# Patient Record
Sex: MALE | Race: WHITE
[De-identification: names, ages, dates, MRNs, and addresses within clinical notes are randomized per-mention and may not be internally consistent; named-entity substitution may affect disease eponyms.]

---

## 2018-12-05 ENCOUNTER — HOSPITAL ENCOUNTER (OUTPATIENT)
Dept: HOSPITAL 74 - FASU-ENDO | Age: 23
Discharge: HOME | End: 2018-12-05
Attending: INTERNAL MEDICINE
Payer: COMMERCIAL

## 2018-12-05 VITALS — DIASTOLIC BLOOD PRESSURE: 76 MMHG | HEART RATE: 72 BPM | SYSTOLIC BLOOD PRESSURE: 120 MMHG

## 2018-12-05 VITALS — BODY MASS INDEX: 26.6 KG/M2

## 2018-12-05 VITALS — TEMPERATURE: 98 F

## 2018-12-05 DIAGNOSIS — K63.5: ICD-10-CM

## 2018-12-05 DIAGNOSIS — K64.8: Primary | ICD-10-CM

## 2018-12-05 PROCEDURE — 0DBK8ZX EXCISION OF ASCENDING COLON, VIA NATURAL OR ARTIFICIAL OPENING ENDOSCOPIC, DIAGNOSTIC: ICD-10-PCS | Performed by: INTERNAL MEDICINE

## 2018-12-05 PROCEDURE — 0DBN8ZX EXCISION OF SIGMOID COLON, VIA NATURAL OR ARTIFICIAL OPENING ENDOSCOPIC, DIAGNOSTIC: ICD-10-PCS | Performed by: INTERNAL MEDICINE

## 2018-12-05 PROCEDURE — 0DBP8ZX EXCISION OF RECTUM, VIA NATURAL OR ARTIFICIAL OPENING ENDOSCOPIC, DIAGNOSTIC: ICD-10-PCS | Performed by: INTERNAL MEDICINE

## 2018-12-05 PROCEDURE — 0DBL8ZX EXCISION OF TRANSVERSE COLON, VIA NATURAL OR ARTIFICIAL OPENING ENDOSCOPIC, DIAGNOSTIC: ICD-10-PCS | Performed by: INTERNAL MEDICINE

## 2018-12-05 PROCEDURE — 0DBM8ZX EXCISION OF DESCENDING COLON, VIA NATURAL OR ARTIFICIAL OPENING ENDOSCOPIC, DIAGNOSTIC: ICD-10-PCS | Performed by: INTERNAL MEDICINE

## 2018-12-10 NOTE — PATH
Surgical Pathology Report



Patient Name:  MARY DONATO

Accession #:  [id number]

[facility]. Rec. #:  [id number]                                                        

   /Age/Gender:  1995 (Age: 23) / M

Account:  [id number]                                                          

             Location: [facility]

Taken:  2018

Received:  2018

Reported:  12/10/2018

Physicians:  Marilee Glover M.D.

  



Specimen(s) Received

A: BX RIGHT COLON 

B: TRANSVERSE COLON 

C: DESCENDING COLON 

D: BX SIGMOID 

E: RECTUM 





Clinical History

Rectal bleeding

Postoperative diagnosis: Small hemorrhoids







Final Diagnosis

A. RIGHT COLON, BIOPSY:

COLONIC MUCOSA WITH NO PATHOLOGIC FINDINGS.



B. TRANSVERSE COLON, BIOPSY:

COLONIC MUCOSA SHOWING SMALL BENIGN/REACTIVE LYMPHOID AGGREGATE.



C. DESCENDING COLON, BIOPSY:

COLONIC MUCOSA SHOWING SMALL BENIGN/REACTIVE LYMPHOID AGGREGATE.



D. SIGMOID, BIOPSY:

COLONIC MUCOSA WITH NO PATHOLOGIC FINDINGS.



E. RECTUM, BIOPSY:

COLONIC MUCOSA SHOWING MILD SURFACE HYPERPLASTIC CHANGE AND SMALL

BENIGN/REACTIVE LYMPHOID AGGREGATE.







***Electronically Signed***

Thu Talavera M.D.





Gross Description

A.  Received in formalin, labeled "biopsy right colon" is a tan, irregular

portion of soft tissue measuring 0.4 cm. in greatest dimension. The specimen is

submitted in toto in one cassette.



B.  Received in formalin, labeled "biopsy transverse colon" is a tan, irregular

portion of soft tissue measuring 0.8 cm. in greatest dimension. The specimen is

submitted in toto in one cassette.



C.  Received in formalin, labeled "biopsy descending colon" is a tan, irregular

portion of soft tissue measuring 0.4 cm. in greatest dimension. The specimen is

submitted in toto in one cassette.



D.  Received in formalin, labeled "sigmoid" is a tan, irregular portion of soft

tissue measuring 0.4 cm. in greatest dimension. The specimen is submitted in

toto in one cassette.



E.  Received in formalin, labeled "rectum" is a tan, irregular portion of soft

tissue measuring 0.3 cm. in greatest dimension. The specimen is submitted in

toto in one cassette.